# Patient Record
Sex: FEMALE | Race: WHITE | NOT HISPANIC OR LATINO
[De-identification: names, ages, dates, MRNs, and addresses within clinical notes are randomized per-mention and may not be internally consistent; named-entity substitution may affect disease eponyms.]

---

## 2021-02-06 ENCOUNTER — APPOINTMENT (OUTPATIENT)
Dept: AFTER HOURS CARE | Facility: EMERGENCY ROOM | Age: 66
End: 2021-02-06
Payer: MEDICARE

## 2021-02-06 DIAGNOSIS — R30.0 DYSURIA: ICD-10-CM

## 2021-02-06 PROBLEM — Z00.00 ENCOUNTER FOR PREVENTIVE HEALTH EXAMINATION: Status: ACTIVE | Noted: 2021-02-06

## 2021-02-06 PROCEDURE — 99204 OFFICE O/P NEW MOD 45 MIN: CPT | Mod: 95

## 2021-02-10 PROBLEM — R30.0 DYSURIA: Status: ACTIVE | Noted: 2021-02-10

## 2021-07-28 NOTE — HISTORY OF PRESENT ILLNESS
[Home] : at home, [unfilled] , at the time of the visit. [Verbal consent obtained from patient] : the patient, [unfilled] [Other Location: e.g. Home (Enter Location, City,State)___] : at [unfilled] [FreeTextEntry8] : 66F no hx p/w 3d dysuria and frequency without fever, back pain. Pt also st that she had diarrhea yesterday and the day before but she has been comfortable with no BMs today.  Pt st she thinks the diarrhea is unrelated since she feels better GI-wise, but her urinary sx continue. St it feels similar to her UTI in the past.  Not sexually active.  No vag discharge.  No n/v.

## 2023-04-07 ENCOUNTER — EMERGENCY (EMERGENCY)
Facility: HOSPITAL | Age: 68
LOS: 0 days | Discharge: HOME | End: 2023-04-07
Attending: STUDENT IN AN ORGANIZED HEALTH CARE EDUCATION/TRAINING PROGRAM
Payer: MEDICARE

## 2023-04-07 VITALS
HEART RATE: 81 BPM | DIASTOLIC BLOOD PRESSURE: 78 MMHG | RESPIRATION RATE: 20 BRPM | TEMPERATURE: 98 F | OXYGEN SATURATION: 98 % | SYSTOLIC BLOOD PRESSURE: 136 MMHG

## 2023-04-07 VITALS
HEART RATE: 78 BPM | SYSTOLIC BLOOD PRESSURE: 145 MMHG | TEMPERATURE: 98 F | RESPIRATION RATE: 18 BRPM | WEIGHT: 197.98 LBS | DIASTOLIC BLOOD PRESSURE: 83 MMHG | HEIGHT: 65 IN | OXYGEN SATURATION: 97 %

## 2023-04-07 DIAGNOSIS — R30.0 DYSURIA: ICD-10-CM

## 2023-04-07 DIAGNOSIS — N39.0 URINARY TRACT INFECTION, SITE NOT SPECIFIED: ICD-10-CM

## 2023-04-07 DIAGNOSIS — R35.0 FREQUENCY OF MICTURITION: ICD-10-CM

## 2023-04-07 DIAGNOSIS — N13.2 HYDRONEPHROSIS WITH RENAL AND URETERAL CALCULOUS OBSTRUCTION: ICD-10-CM

## 2023-04-07 LAB
ALBUMIN SERPL ELPH-MCNC: 4 G/DL — SIGNIFICANT CHANGE UP (ref 3.3–5)
ALP SERPL-CCNC: 65 U/L — SIGNIFICANT CHANGE UP (ref 40–120)
ALT FLD-CCNC: 26 U/L — SIGNIFICANT CHANGE UP (ref 12–78)
ANION GAP SERPL CALC-SCNC: 5 MMOL/L — SIGNIFICANT CHANGE UP (ref 5–17)
APPEARANCE UR: CLEAR — SIGNIFICANT CHANGE UP
AST SERPL-CCNC: 11 U/L — LOW (ref 15–37)
BACTERIA # UR AUTO: ABNORMAL
BASOPHILS # BLD AUTO: 0.06 K/UL — SIGNIFICANT CHANGE UP (ref 0–0.2)
BASOPHILS NFR BLD AUTO: 1 % — SIGNIFICANT CHANGE UP (ref 0–2)
BILIRUB SERPL-MCNC: 1.2 MG/DL — SIGNIFICANT CHANGE UP (ref 0.2–1.2)
BILIRUB UR-MCNC: NEGATIVE — SIGNIFICANT CHANGE UP
BUN SERPL-MCNC: 17 MG/DL — SIGNIFICANT CHANGE UP (ref 7–23)
CALCIUM SERPL-MCNC: 8.6 MG/DL — SIGNIFICANT CHANGE UP (ref 8.5–10.1)
CHLORIDE SERPL-SCNC: 108 MMOL/L — SIGNIFICANT CHANGE UP (ref 96–108)
CO2 SERPL-SCNC: 27 MMOL/L — SIGNIFICANT CHANGE UP (ref 22–31)
COLOR SPEC: YELLOW — SIGNIFICANT CHANGE UP
CREAT SERPL-MCNC: 0.61 MG/DL — SIGNIFICANT CHANGE UP (ref 0.5–1.3)
DIFF PNL FLD: ABNORMAL
EGFR: 97 ML/MIN/1.73M2 — SIGNIFICANT CHANGE UP
EOSINOPHIL # BLD AUTO: 0.13 K/UL — SIGNIFICANT CHANGE UP (ref 0–0.5)
EOSINOPHIL NFR BLD AUTO: 2.2 % — SIGNIFICANT CHANGE UP (ref 0–6)
EPI CELLS # UR: ABNORMAL
GLUCOSE SERPL-MCNC: 100 MG/DL — HIGH (ref 70–99)
GLUCOSE UR QL: NEGATIVE MG/DL — SIGNIFICANT CHANGE UP
HCT VFR BLD CALC: 46.4 % — HIGH (ref 34.5–45)
HGB BLD-MCNC: 14.8 G/DL — SIGNIFICANT CHANGE UP (ref 11.5–15.5)
IMM GRANULOCYTES NFR BLD AUTO: 0.2 % — SIGNIFICANT CHANGE UP (ref 0–0.9)
KETONES UR-MCNC: NEGATIVE — SIGNIFICANT CHANGE UP
LACTATE SERPL-SCNC: 0.8 MMOL/L — SIGNIFICANT CHANGE UP (ref 0.7–2)
LEUKOCYTE ESTERASE UR-ACNC: NEGATIVE — SIGNIFICANT CHANGE UP
LYMPHOCYTES # BLD AUTO: 2.17 K/UL — SIGNIFICANT CHANGE UP (ref 1–3.3)
LYMPHOCYTES # BLD AUTO: 37.5 % — SIGNIFICANT CHANGE UP (ref 13–44)
MCHC RBC-ENTMCNC: 28.2 PG — SIGNIFICANT CHANGE UP (ref 27–34)
MCHC RBC-ENTMCNC: 31.9 G/DL — LOW (ref 32–36)
MCV RBC AUTO: 88.4 FL — SIGNIFICANT CHANGE UP (ref 80–100)
MONOCYTES # BLD AUTO: 0.46 K/UL — SIGNIFICANT CHANGE UP (ref 0–0.9)
MONOCYTES NFR BLD AUTO: 8 % — SIGNIFICANT CHANGE UP (ref 2–14)
NEUTROPHILS # BLD AUTO: 2.95 K/UL — SIGNIFICANT CHANGE UP (ref 1.8–7.4)
NEUTROPHILS NFR BLD AUTO: 51.1 % — SIGNIFICANT CHANGE UP (ref 43–77)
NITRITE UR-MCNC: NEGATIVE — SIGNIFICANT CHANGE UP
NRBC # BLD: 0 /100 WBCS — SIGNIFICANT CHANGE UP (ref 0–0)
PH UR: 5 — SIGNIFICANT CHANGE UP (ref 5–8)
PLATELET # BLD AUTO: 252 K/UL — SIGNIFICANT CHANGE UP (ref 150–400)
POTASSIUM SERPL-MCNC: 3.6 MMOL/L — SIGNIFICANT CHANGE UP (ref 3.5–5.3)
POTASSIUM SERPL-SCNC: 3.6 MMOL/L — SIGNIFICANT CHANGE UP (ref 3.5–5.3)
PROT SERPL-MCNC: 7.9 GM/DL — SIGNIFICANT CHANGE UP (ref 6–8.3)
PROT UR-MCNC: 15 MG/DL
RBC # BLD: 5.25 M/UL — HIGH (ref 3.8–5.2)
RBC # FLD: 13.7 % — SIGNIFICANT CHANGE UP (ref 10.3–14.5)
RBC CASTS # UR COMP ASSIST: >50 /HPF (ref 0–4)
SODIUM SERPL-SCNC: 140 MMOL/L — SIGNIFICANT CHANGE UP (ref 135–145)
SP GR SPEC: 1.02 — SIGNIFICANT CHANGE UP (ref 1.01–1.02)
UROBILINOGEN FLD QL: NEGATIVE MG/DL — SIGNIFICANT CHANGE UP
WBC # BLD: 5.78 K/UL — SIGNIFICANT CHANGE UP (ref 3.8–10.5)
WBC # FLD AUTO: 5.78 K/UL — SIGNIFICANT CHANGE UP (ref 3.8–10.5)
WBC UR QL: ABNORMAL

## 2023-04-07 PROCEDURE — 99284 EMERGENCY DEPT VISIT MOD MDM: CPT

## 2023-04-07 PROCEDURE — 76775 US EXAM ABDO BACK WALL LIM: CPT | Mod: 26

## 2023-04-07 PROCEDURE — 99285 EMERGENCY DEPT VISIT HI MDM: CPT

## 2023-04-07 RX ORDER — PHENAZOPYRIDINE HCL 100 MG
200 TABLET ORAL ONCE
Refills: 0 | Status: COMPLETED | OUTPATIENT
Start: 2023-04-07 | End: 2023-04-07

## 2023-04-07 RX ORDER — TAMSULOSIN HYDROCHLORIDE 0.4 MG/1
1 CAPSULE ORAL
Qty: 5 | Refills: 0
Start: 2023-04-07 | End: 2023-04-11

## 2023-04-07 RX ORDER — IBUPROFEN 200 MG
1 TABLET ORAL
Qty: 12 | Refills: 0
Start: 2023-04-07 | End: 2023-04-09

## 2023-04-07 RX ORDER — CEFDINIR 250 MG/5ML
1 POWDER, FOR SUSPENSION ORAL
Qty: 20 | Refills: 0
Start: 2023-04-07 | End: 2023-04-16

## 2023-04-07 RX ORDER — SODIUM CHLORIDE 9 MG/ML
1000 INJECTION INTRAMUSCULAR; INTRAVENOUS; SUBCUTANEOUS ONCE
Refills: 0 | Status: COMPLETED | OUTPATIENT
Start: 2023-04-07 | End: 2023-04-07

## 2023-04-07 RX ORDER — CEFTRIAXONE 500 MG/1
1000 INJECTION, POWDER, FOR SOLUTION INTRAMUSCULAR; INTRAVENOUS ONCE
Refills: 0 | Status: COMPLETED | OUTPATIENT
Start: 2023-04-07 | End: 2023-04-07

## 2023-04-07 RX ADMIN — CEFTRIAXONE 100 MILLIGRAM(S): 500 INJECTION, POWDER, FOR SOLUTION INTRAMUSCULAR; INTRAVENOUS at 17:01

## 2023-04-07 RX ADMIN — SODIUM CHLORIDE 1000 MILLILITER(S): 9 INJECTION INTRAMUSCULAR; INTRAVENOUS; SUBCUTANEOUS at 16:35

## 2023-04-07 RX ADMIN — SODIUM CHLORIDE 1000 MILLILITER(S): 9 INJECTION INTRAMUSCULAR; INTRAVENOUS; SUBCUTANEOUS at 15:32

## 2023-04-07 RX ADMIN — Medication 200 MILLIGRAM(S): at 17:01

## 2023-04-07 NOTE — ED ADULT NURSE NOTE - URINE CHARACTERISTICS
Brandin Santos is overdue for her INR:     Date of next INR: 4/22/2022    [facility] clear

## 2023-04-07 NOTE — ED PROVIDER NOTE - PHYSICAL EXAMINATION
GENERAL: Awake, alert, NAD  HEENT: NC/AT, moist mucous membranes  LUNGS: CTAB, no wheezes or crackles   CARDIAC: RRR, no m/r/g  ABDOMEN: Soft, normal BS, non tender, non distended, no rebound, no guarding  BACK: No midline spinal tenderness, no CVA tenderness  EXT: No edema, no calf tenderness, no deformities.  NEURO: A&Ox3. Moving all extremities.  SKIN: Warm and dry. No rash.  PSYCH: Normal affect.

## 2023-04-07 NOTE — ED PROVIDER NOTE - CARE PROVIDER_API CALL
Kervin Mack)  Urology  [address] [facility], [address] [place] [address]  Phone: [phone]  Fax: [phone]  Follow Up Time: 1-3 Days

## 2023-04-07 NOTE — ED PROVIDER NOTE - NS ED ROS FT
CONST: no fevers, no chills  EYES: no pain, no vision changes  ENT: no sore throat, no ear pain, no change in hearing  CV: no chest pain, no leg swelling  RESP: no shortness of breath, no cough  ABD: no abdominal pain, no nausea, no vomiting, no diarrhea  : + dysuria, +frequency, no flank pain, no hematuria  MSK: no back pain, no extremity pain  NEURO: no headache or additional neurologic complaints  HEME: no easy bleeding  SKIN:  no rash

## 2023-04-07 NOTE — ED PROVIDER NOTE - NSFOLLOWUPINSTRUCTIONS_ED_ALL_ED_FT
You were seen in the ED for a kidney stone.    Please follow up with urology in the office.    Take antibiotics and flomax as prescribed.    You can use ibuprofen every 6 hours as needed for pain.    Strain your urine.    If you develop fever, chills, worsening pain, vomiting, difficulty urinating, please return to the ED.

## 2023-04-07 NOTE — ED PROVIDER NOTE - OBJECTIVE STATEMENT
69 y/o F with no significant PMH presenting to the ED with dysuria, frequency. States her symptoms have been ongoing x 1 week. She finished a course of bactrim x 3 days and started taking cipro as symptoms did not resolve. Now is day 3 of cipro. She reports having an outpatient CT that demonstrated a L sided kidney stone. Reports she previously had back pain but that has now resolved. No fever, chills, N/V. No hx of kidney stones or UTIs. No hematuria.

## 2023-04-07 NOTE — ED ADULT NURSE NOTE - NS ED NOTE  TALK SOMEONE YN
's visit attempted in Pre-op. Mr. Kenna Saravia, preferred name Nicole Liang, Kumar's wife [address] [place] [address] in the waiting room and offered prayer for patient, family, and staff.      Paxton Lyon 68  Board Certified  No

## 2023-04-07 NOTE — ED PROVIDER NOTE - PATIENT PORTAL LINK FT
You can access the FollowMyHealth Patient Portal offered by [facility] by registering at the following website: [email or web address]. By joining [employer]’s FollowMyHealth portal, you will also be able to view your health information using other applications (apps) compatible with our system.
no

## 2023-04-07 NOTE — ED ADULT TRIAGE NOTE - CHIEF COMPLAINT QUOTE
pt vaginal and left flank pain x 1 week. telehealth gave rx bactrim x 1 week ago, given rx cipro and ct scan done by urgent care yesterday. sent to ED by urgent care for + kidney stones.

## 2023-04-07 NOTE — CONSULT NOTE ADULT - SUBJECTIVE AND OBJECTIVE BOX
Chief Complaint:  Patient is a 68y old  Female who presents with a chief complaint of     HPI: 67 y/o F with no significant PMH presenting to the ED with dysuria, frequency. States her symptoms have been ongoing x 1 week. She finished a course of bactrim x 3 days and started taking cipro as symptoms did not resolve. Now is day 3 of cipro. She reports having an outpatient CT that demonstrated a L sided kidney stone. Reports she previously had back pain but that has now resolved. No fever, chills, N/V. No hx of kidney stones or UTIs. No hematuria.      PMH/PSH:PAST MEDICAL & SURGICAL HISTORY:      Allergies:  No Known Allergies      Medications:      Review of Systems:    Negative exept for HPI    Relevant Family History:   FAMILY HISTORY:      Relevant Social History: Alcohol ( -) , Tobacco ( -) , Illicit drugs (- )     Physical Exam:    Vital Signs:  Vital Signs Last 24 Hrs  T(C): 36.9 (2023 13:57), Max: 36.9 (2023 13:57)  T(F): 98.4 (2023 13:57), Max: 98.4 (2023 13:57)  HR: 78 (2023 13:57) (78 - 78)  BP: 145/83 (2023 13:57) (145/83 - 145/83)  BP(mean): --  RR: 18 (2023 13:57) (18 - 18)  SpO2: 97% (2023 13:57) (97% - 97%)    Parameters below as of 2023 13:57  Patient On (Oxygen Delivery Method): room air      Daily Height in cm: 165.1 (2023 13:57)    Daily     General:  Appears stated age, well-groomed, well-nourished, no distress  HEENT:  NC/AT,  conjunctivae clear and pink, no thyromegaly, nodules, adenopathy, no JVD, anicteric sclera  Chest:  Full & symmetric excursion, no increased effort, breath sounds clear  Cardiovascular:  Regular rhythm, S1, S2, no murmur/rub/S3/S4, no abdominal bruit, no edema  Abdomen:  Soft, non tender, non distended, normoactive bowel sounds,  no masses , no hepatosplenomegaly, no signs of chronic liver disease  Extremities:  no cyanosis, clubbing or edema  Skin:  No rash/erythema/ecchymoses/petechiae/wounds/abscess/warm/dry  Neuro/Psych:  Alert, oriented, no asterixis, no tremor, no encephalopathy  : No CVA or SP tenderness, adequate meatus, bladder nonpalpable    Laboratory:                          14.8   5.78  )-----------( 252      ( 2023 15:25 )             46.4         140  |  108  |  17  ----------------------------<  100<H>  3.6   |  27  |  0.61    Ca    8.6      2023 15:25    TPro  7.9  /  Alb  4.0  /  TBili  1.2  /  DBili  x   /  AST  11<L>  /  ALT  26  /  AlkPhos  65      LIVER FUNCTIONS - ( 2023 15:25 )  Alb: 4.0 g/dL / Pro: 7.9 gm/dL / ALK PHOS: 65 U/L / ALT: 26 U/L / AST: 11 U/L / GGT: x             Urinalysis Basic - ( 2023 15:25 )    Color: Yellow / Appearance: Clear / S.025 / pH: x  Gluc: x / Ketone: Negative  / Bili: Negative / Urobili: Negative mg/dL   Blood: x / Protein: 15 mg/dL / Nitrite: Negative   Leuk Esterase: Negative / RBC: >50 /HPF / WBC 6-10   Sq Epi: x / Non Sq Epi: x / Bacteria: Few    Intake and Output      Imaging:  < from: US Renal (23 @ 16:56) >    ACC: [id number] EXAM:  US KIDNEY(S)   ORDERED BY: LITA PIERCE     PROCEDURE DATE:  2023          INTERPRETATION:  CLINICAL INFORMATION: Microscopic hematuria.    COMPARISON: None available.    TECHNIQUE: Sonography of the kidneys and bladder.    FINDINGS:  Right kidney: 12.4 cm. Mild right-sided hydronephrosis. Multiple   hyperechoic foci are noted within the renal hilar regions measuring up to   1.4 cm, likely renal calculi. No space-occupying lesions evident.    Left kidney: 12.1 cm. Mild left-sided hydronephrosis. No renal calculi.   No space-occupying lesions.    Urinary bladder: Bilateral ureteral jets noted. Prevoid urinary bladder   volume approximates 62 cc. Post void urinary bladder volume approximates   18 cc. A 0.8 x 0.7 x 0.6 cm hyperechoic focus is identified likely in the   region of the left UVJ.    IMPRESSION:  Mild bilateral hydronephrosis. Right renal calculi. A 0.8 x 0.7 x 0.6 cm   hyperechoic focus is identified likely in the region of the left UVJ.   Bilateral ureteral jets noted.

## 2023-04-07 NOTE — CONSULT NOTE ADULT - ASSESSMENT
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   Will need CT urogram, Can follow up with Dr Mack in office on Monday as discussed with patient

## 2023-04-07 NOTE — ED PROVIDER NOTE - CLINICAL SUMMARY MEDICAL DECISION MAKING FREE TEXT BOX
69 y/o F with no significant PMH presenting to the ED with dysuria in setting of renal stone.  Vitals stable.  Will obtain labs in setting of UTI, will obtain US to evaluate for hydro in setting of renal stone  Pain currently controlled  Reassess following labs/imaging

## 2023-04-07 NOTE — ED PROVIDER NOTE - PROGRESS NOTE DETAILS
U/S with L sided renal stone at UVJ, +mild bilateral hydro, urine appears infected. Discussed with urology, will come see patient.

## 2023-04-08 LAB
CULTURE RESULTS: SIGNIFICANT CHANGE UP
SPECIMEN SOURCE: SIGNIFICANT CHANGE UP

## 2023-04-10 PROBLEM — Z00.00 ENCOUNTER FOR PREVENTIVE HEALTH EXAMINATION: Noted: 2023-04-10

## 2023-04-14 ENCOUNTER — APPOINTMENT (OUTPATIENT)
Dept: UROLOGY | Facility: CLINIC | Age: 68
End: 2023-04-14
Payer: MEDICARE

## 2023-04-14 VITALS
DIASTOLIC BLOOD PRESSURE: 80 MMHG | HEIGHT: 65 IN | WEIGHT: 198 LBS | TEMPERATURE: 97.8 F | BODY MASS INDEX: 32.99 KG/M2 | SYSTOLIC BLOOD PRESSURE: 142 MMHG | OXYGEN SATURATION: 96 % | HEART RATE: 76 BPM

## 2023-04-14 DIAGNOSIS — Z82.49 FAMILY HISTORY OF ISCHEMIC HEART DISEASE AND OTHER DISEASES OF THE CIRCULATORY SYSTEM: ICD-10-CM

## 2023-04-14 DIAGNOSIS — Z78.9 OTHER SPECIFIED HEALTH STATUS: ICD-10-CM

## 2023-04-14 DIAGNOSIS — Z83.3 FAMILY HISTORY OF DIABETES MELLITUS: ICD-10-CM

## 2023-04-14 PROCEDURE — 99204 OFFICE O/P NEW MOD 45 MIN: CPT

## 2023-04-14 RX ORDER — TAMSULOSIN HYDROCHLORIDE 0.4 MG/1
CAPSULE ORAL
Refills: 0 | Status: DISCONTINUED | COMMUNITY

## 2023-04-14 RX ORDER — CEFDINIR 300 MG/1
CAPSULE ORAL
Refills: 0 | Status: DISCONTINUED | COMMUNITY

## 2023-04-14 RX ORDER — IBUPROFEN 600 MG/1
600 TABLET, FILM COATED ORAL
Refills: 0 | Status: DISCONTINUED | COMMUNITY

## 2023-04-14 NOTE — HISTORY OF PRESENT ILLNESS
[FreeTextEntry1] : 68 year old female here for hospital follow up,\par \par  was in ER at [place], for L side flank pain\par \par L flank pain pain onset 3/31/2023, w urinary frequency, dysuria\par denies nausea, fever, vomiting, and history of stones\par teledoc visit- given 3 days of bactrim\par \par 1 week later, no resolution of symptoms, went to urgent care- given cipro, which she is still taking, and sent for CT scan\par \par CT scan shows-\par abnormal pancreatic finding\par 2.8 cm left adrenal nodule\par  R kidney stones largest of which is 0.3 mm and duplicated collecting system\par \par L kidney fullness and probable cyst\par 8 mm UVJ and 5 mm distal L ureteral stones\par \par In ER had renal sono-left hydro and right hydro lower pole moiety\par \par \par \par pt brought ureteral stone which she passed 4/9/2023\par was on tamsulosin, has discontinued since passing stone\par \par reviewed lab work from ER\par CBC normal\par CMP normal calcium, creatine (all normal)\par UA+micro- microhematuria\par urine culture- negative

## 2023-04-14 NOTE — REVIEW OF SYSTEMS
[Feeling Tired] : feeling tired [Abdominal Pain] : abdominal pain [Constipation] : constipation [Urine Infection (bladder/kidney)] : bladder/kidney infection [Pain during urination] : pain during urination [Blood in urine that you can see] : blood visible in urine [History of kidney stones] : history of kidney stones [Wake up at night to urinate  How many times?  ___] : wakes up to urinate [unfilled] times during the night [Strain or push to urinate] : strain or push to urinate [Bladder fullness after urinating] : bladder fullness after urinating [Leakage of urine with straining, coughing, laughing] : leakage of urine with straining, coughing, laughing [see HPI] : see HPI [Negative] : Gastrointestinal

## 2023-04-14 NOTE — END OF VISIT
[FreeTextEntry3] : All medical record entries made by the scribe today, were at my direction and personally dictated to them by me, Dr. Kervin Mack on 04/14/2023. I have reviewed the chart and agree that the record accurately reflects my personal performance of the history, physical exam, assessment, and plan. I have also personally directed, reviewed, and agreed with the chart.\par

## 2023-04-14 NOTE — PHYSICAL EXAM
[General Appearance - Well Developed] : well developed [General Appearance - Well Nourished] : well nourished [Normal Appearance] : normal appearance [Well Groomed] : well groomed [General Appearance - In No Acute Distress] : no acute distress [Edema] : no peripheral edema [Respiration, Rhythm And Depth] : normal respiratory rhythm and effort [Exaggerated Use Of Accessory Muscles For Inspiration] : no accessory muscle use [Abdomen Soft] : soft [Abdomen Tenderness] : non-tender [Costovertebral Angle Tenderness] : no ~M costovertebral angle tenderness [Normal Station and Gait] : the gait and station were normal for the patient's age [] : no rash [No Focal Deficits] : no focal deficits [Oriented To Time, Place, And Person] : oriented to person, place, and time [Affect] : the affect was normal [Mood] : the mood was normal [Not Anxious] : not anxious [No Palpable Adenopathy] : no palpable adenopathy [FreeTextEntry1] : chaperone- lilia small

## 2023-04-14 NOTE — ASSESSMENT
[FreeTextEntry1] : 68 year old female w hydronephrosis, kidney stones, and ureteral stones, \par \par passed the 8 mm stone\par \par Advise 24 hr urine calcium, citrate, uric acid, and oxalate, \par \par  serum uric acid sent today\par \par ordered CT abdomen and pelvis urogram w/wo IV contrast to follow up hydro and remaining stone\par \par  analysis of passed stone ordered\par \par can discontinue cefidinir\par \par advised pt to increase water intake to 2 1/2 liters\par \par will send request for [facility] endocrinologist to eval adrenal adenoma\par \par  Also  GI to f/u  abnormal pancreatic finding\par \par await CTU and 24 hour urine\par \par f/u for renal US in 6 months based on findings\par \par

## 2023-04-14 NOTE — LETTER BODY
[Dear  ___] : Dear  [unfilled], [Consult Letter:] : I had the pleasure of evaluating your patient, [unfilled]. [Please see my note below.] : Please see my note below. [Consult Closing:] : Thank you very much for allowing me to participate in the care of this patient.  If you have any questions, please do not hesitate to contact me. [Sincerely,] : Sincerely, [FreeTextEntry1] : see below note [FreeTextEntry3] : Kervin Mack MD FACS\par \par Attending Urologist-[facility]\par Chief-Urology Division [facility]\par Associate Clinical Professor-[employer] [facility] [facility]\par \par

## 2023-04-18 ENCOUNTER — APPOINTMENT (OUTPATIENT)
Dept: UROLOGY | Facility: CLINIC | Age: 68
End: 2023-04-18

## 2023-04-19 ENCOUNTER — APPOINTMENT (OUTPATIENT)
Dept: CT IMAGING | Facility: CLINIC | Age: 68
End: 2023-04-19
Payer: MEDICARE

## 2023-04-19 ENCOUNTER — OUTPATIENT (OUTPATIENT)
Dept: OUTPATIENT SERVICES | Facility: HOSPITAL | Age: 68
LOS: 1 days | End: 2023-04-19
Payer: MEDICARE

## 2023-04-19 ENCOUNTER — TRANSCRIPTION ENCOUNTER (OUTPATIENT)
Age: 68
End: 2023-04-19

## 2023-04-19 DIAGNOSIS — N20.9 URINARY CALCULUS, UNSPECIFIED: ICD-10-CM

## 2023-04-19 PROCEDURE — 74178 CT ABD&PLV WO CNTR FLWD CNTR: CPT

## 2023-04-19 PROCEDURE — 74178 CT ABD&PLV WO CNTR FLWD CNTR: CPT | Mod: 26,MH

## 2023-04-25 ENCOUNTER — NON-APPOINTMENT (OUTPATIENT)
Age: 68
End: 2023-04-25

## 2023-04-25 LAB
NIDUS STONE QN: NORMAL
RESULT COMMENT: NORMAL
URATE SERPL-MCNC: 5.2 MG/DL

## 2023-05-05 ENCOUNTER — APPOINTMENT (OUTPATIENT)
Dept: UROLOGY | Facility: CLINIC | Age: 68
End: 2023-05-05
Payer: MEDICARE

## 2023-05-05 VITALS
HEART RATE: 74 BPM | TEMPERATURE: 97.4 F | SYSTOLIC BLOOD PRESSURE: 136 MMHG | DIASTOLIC BLOOD PRESSURE: 83 MMHG | OXYGEN SATURATION: 97 %

## 2023-05-05 PROCEDURE — 76775 US EXAM ABDO BACK WALL LIM: CPT

## 2023-05-05 PROCEDURE — 99213 OFFICE O/P EST LOW 20 MIN: CPT

## 2023-05-05 NOTE — PHYSICAL EXAM
[General Appearance - Well Developed] : well developed [General Appearance - Well Nourished] : well nourished [Normal Appearance] : normal appearance [Well Groomed] : well groomed [General Appearance - In No Acute Distress] : no acute distress [Abdomen Soft] : soft [Costovertebral Angle Tenderness] : no ~M costovertebral angle tenderness [Abdomen Tenderness] : non-tender

## 2023-05-27 LAB
CALCIUM SERPL-MCNC: 9 MG/DL
PARATHYROID HORMONE INTACT: 46 PG/ML

## 2023-05-27 NOTE — HISTORY OF PRESENT ILLNESS
[FreeTextEntry1] : 67 y/o female following up for hydronephrosis,with  ureteral stones\par \par kidney stone analysis 4/14/2023-\par 60% calcium oxalate monohydrate\par 30% calcium phosphate (apatite)\par 10% calcium oxalate dihydrate\par \par reviewed 24 hr urine- low urine volume and hypercalcuria\par \par currently without pain-?passed second stone\par \par

## 2023-05-27 NOTE — END OF VISIT
[FreeTextEntry3] : All medical record entries made by the scribe today, were at my direction and personally dictated to them by me, Dr. Kervin Mack on 05/05/2023. I have reviewed the chart and agree that the record accurately reflects my personal performance of the history, physical exam, assessment, and plan. I have also personally directed, reviewed, and agreed with the chart.\par

## 2023-05-27 NOTE — ASSESSMENT
[FreeTextEntry1] : 67 y/o female w kidney stones here for renal US\par \par PTH sent today\par \par renal US today shows-\par parapelvic cysts on R side as well as duplicatied collecting system\par L kidney demonstrates mild hydronephrosis\par both kidneys normal in size and echogenicity w/o stones or solid masses\par \par continue to strain urine for stone collection,\par if unable to pass, d/w pt and her daughter possible surgery- u-scope w laser litho / stent\par all risks, benefits reviewed\par \par \par continue to maintain high water intake up to 2.5 L per day\par \par pt will call office if she develops fever, nausea, intense pain\par \par has gone to GI for abnormal pancreatic finding, is to go for MRI w sedation\par \par has appt scheduled w endocrinologist to evaluate adrenal adenoma and hypercalcuria\par \par f/u in 6 months for renal US unless OR is needed

## 2023-06-28 ENCOUNTER — APPOINTMENT (OUTPATIENT)
Dept: ENDOCRINOLOGY | Facility: CLINIC | Age: 68
End: 2023-06-28
Payer: MEDICARE

## 2023-06-28 VITALS
SYSTOLIC BLOOD PRESSURE: 122 MMHG | TEMPERATURE: 97.2 F | HEIGHT: 65 IN | DIASTOLIC BLOOD PRESSURE: 76 MMHG | HEART RATE: 96 BPM | BODY MASS INDEX: 33.55 KG/M2 | OXYGEN SATURATION: 98 % | WEIGHT: 201.38 LBS

## 2023-06-28 PROCEDURE — 99204 OFFICE O/P NEW MOD 45 MIN: CPT

## 2023-06-28 NOTE — HISTORY OF PRESENT ILLNESS
[FreeTextEntry1] : 68 year F here for assessment of adrenal adenoma: \par \par Noted as incidental finding on CT urogram done for evaluation for nephrolithiasis \par \par Date of Imagin2023\par Modality: CT A/P left 2.8cm adrenal nodule c/w adenoma\par \par Patient with past medical hx as below, remarkable for:  renal stones \par \par History of known prior/ current malignancy: No/\par    \par Episodic headaches: No \par Episodic sweating: No\par Forceful palpitations: No\par Tremors: No\par Pallor: No\par Dyspnea: No \par Generalized weakness: No\par Panic attacks: No\par Weight loss: No\par Polyuria: No\par Polydipsia: No \par Constipation: No \par Known history of elevations in blood pressure related to diagnostic procedures, anesthesia induction, surgery, with certain foods or beverages: No\par \par History of HTN: No\par Renal impairment: No \par \par Appreciable fat buildup in face, neck, back, abdomen, chest: No\par Arms and legs becoming thin: No\par Purple stretch marks: No\par \par Women:  \par Hirsutism: No\par Acne: No\par Male-pattern baldness: No\par Menstrual irregularities, oligomenorrhea or amenorrhea: No \par Infertility: No\par \par \par

## 2023-06-28 NOTE — REASON FOR VISIT
[Initial Evaluation] : an initial evaluation [Adrenal Evaluation/Adrenal Disorder] : adrenal evaluation/adrenal disorder [Family Member] : family member

## 2023-07-05 LAB
ALDOSTERONE SERUM: 22.9 NG/DL
ANION GAP SERPL CALC-SCNC: 14 MMOL/L
BUN SERPL-MCNC: 18 MG/DL
CALCIUM SERPL-MCNC: 9.6 MG/DL
CHLORIDE SERPL-SCNC: 110 MMOL/L
CO2 SERPL-SCNC: 21 MMOL/L
CORTIS SERPL-MCNC: 2.1 UG/DL
CREAT SERPL-MCNC: 0.55 MG/DL
EGFR: 100 ML/MIN/1.73M2
GLUCOSE SERPL-MCNC: 129 MG/DL
POTASSIUM SERPL-SCNC: 4.3 MMOL/L
SODIUM SERPL-SCNC: 144 MMOL/L

## 2023-07-06 LAB
METANEPHRINE, PL: 13.9 PG/ML
NORMETANEPHRINE, PL: 92.5 PG/ML

## 2023-07-10 LAB — DHEA-SULFATE, SERUM: 40 UG/DL

## 2023-07-12 LAB — DEXAMETHASONE LEVEL: 235 NG/DL

## 2023-07-17 LAB — RENIN ACTIVITY, PLASMA: 0.85 NG/ML/HR

## 2023-07-31 ENCOUNTER — APPOINTMENT (OUTPATIENT)
Dept: ENDOCRINOLOGY | Facility: CLINIC | Age: 68
End: 2023-07-31
Payer: MEDICARE

## 2023-07-31 VITALS
OXYGEN SATURATION: 96 % | WEIGHT: 199.31 LBS | HEART RATE: 86 BPM | SYSTOLIC BLOOD PRESSURE: 138 MMHG | DIASTOLIC BLOOD PRESSURE: 82 MMHG | HEIGHT: 65 IN | BODY MASS INDEX: 33.21 KG/M2 | TEMPERATURE: 97.6 F

## 2023-07-31 PROCEDURE — 99214 OFFICE O/P EST MOD 30 MIN: CPT

## 2023-08-16 LAB — CORTIS SERPL-MCNC: 2.9 UG/DL

## 2023-08-16 NOTE — HISTORY OF PRESENT ILLNESS
[FreeTextEntry1] : 68 year F here for f/up  of adrenal adenoma:   Noted as incidental finding on CT urogram done for evaluation for nephrolithiasis   Date of Imagin2023 Modality: CT A/P left 2.8cm adrenal nodule c/w adenoma  Patient with past medical hx as below, remarkable for:  renal stones   No new complaints. At last visit lab work up ordered.   She reports that she has another MRI planned with urology in 2023

## 2023-08-16 NOTE — ADDENDUM
[FreeTextEntry1] : 08/16/2023  01:12 PM   Contacted Ms. AYAZ MARRERO at telephone number listed on file to review results of  am cortisol/ 1mg DST done on 8/15/23. Patient agrees to review over phone. Did not fully suppress  -will do 24hr urine cortisol : request to be sent to patient.

## 2023-08-29 ENCOUNTER — APPOINTMENT (OUTPATIENT)
Dept: UROLOGY | Facility: CLINIC | Age: 68
End: 2023-08-29
Payer: MEDICARE

## 2023-08-29 VITALS
WEIGHT: 191 LBS | BODY MASS INDEX: 31.82 KG/M2 | SYSTOLIC BLOOD PRESSURE: 133 MMHG | DIASTOLIC BLOOD PRESSURE: 76 MMHG | TEMPERATURE: 97.2 F | HEIGHT: 65 IN | OXYGEN SATURATION: 96 % | HEART RATE: 74 BPM

## 2023-08-29 DIAGNOSIS — N20.9 URINARY CALCULUS, UNSPECIFIED: ICD-10-CM

## 2023-08-29 PROCEDURE — 99212 OFFICE O/P EST SF 10 MIN: CPT

## 2023-08-29 NOTE — HISTORY OF PRESENT ILLNESS
[FreeTextEntry1] : 67 y/o female here today f/u for UTI h/x of kidney stones Pt went to urgent care over the weekend for frequency and burning, was started on Macrobid and Pyridium.

## 2023-08-29 NOTE — ASSESSMENT
[FreeTextEntry1] : 67 y/o female here today f/u for UTI h/x of kidney stones Pt went to urgent care over the weekend for frequency and burning, was started on Macrobid and Pyridium.  On last CT scan: IMPRESSION: Duplicated right collecting system with normal insertion on the urinary bladder. No hydronephrosis on the right. Mild left hydronephrosis in a UPJ pattern. 4 mm stone in the distal left ureter without ureteral dilatation. Indeterminate left adrenal nodule which can be better evaluated with an adrenal protocol CT.  The patient states having passed stones recently. She is scheduled for MRI in Oct and will come back for re-evaluation with results, to check for residual stones. Prescribing Tamsulosin 0.4 for 1 month.

## 2023-09-06 LAB
ALBUMIN SERPL ELPH-MCNC: 4.4 G/DL
ANION GAP SERPL CALC-SCNC: 12 MMOL/L
BUN SERPL-MCNC: 12 MG/DL
CALCIUM SERPL-MCNC: 9.5 MG/DL
CHLORIDE SERPL-SCNC: 105 MMOL/L
CO2 SERPL-SCNC: 24 MMOL/L
CREAT SERPL-MCNC: 0.67 MG/DL
EGFR: 95 ML/MIN/1.73M2
GLUCOSE SERPL-MCNC: 104 MG/DL
PHOSPHATE SERPL-MCNC: 2.8 MG/DL
POTASSIUM SERPL-SCNC: 4.2 MMOL/L
SODIUM SERPL-SCNC: 141 MMOL/L

## 2023-09-19 LAB
CORTIS 24H UR-MCNC: 24 H
CORTIS 24H UR-MRATE: 74 MCG/24 H
SPECIMEN VOL 24H UR: 1500 ML

## 2023-10-09 ENCOUNTER — APPOINTMENT (OUTPATIENT)
Dept: UROLOGY | Facility: CLINIC | Age: 68
End: 2023-10-09

## 2023-10-16 ENCOUNTER — APPOINTMENT (OUTPATIENT)
Dept: SURGERY | Facility: CLINIC | Age: 68
End: 2023-10-16
Payer: MEDICARE

## 2023-10-16 ENCOUNTER — NON-APPOINTMENT (OUTPATIENT)
Age: 68
End: 2023-10-16

## 2023-10-16 VITALS
BODY MASS INDEX: 31.32 KG/M2 | WEIGHT: 188 LBS | HEIGHT: 65 IN | HEART RATE: 73 BPM | SYSTOLIC BLOOD PRESSURE: 132 MMHG | DIASTOLIC BLOOD PRESSURE: 84 MMHG

## 2023-10-16 DIAGNOSIS — D35.00 BENIGN NEOPLASM OF UNSPECIFIED ADRENAL GLAND: ICD-10-CM

## 2023-10-16 PROCEDURE — 99204 OFFICE O/P NEW MOD 45 MIN: CPT

## 2023-10-17 ENCOUNTER — NON-APPOINTMENT (OUTPATIENT)
Age: 68
End: 2023-10-17

## 2023-10-17 LAB
ACTH SER-ACNC: 18.3 PG/ML
ALDOSTERONE SERUM: 21.3 NG/DL
CANCER AG19-9 SERPL-ACNC: 11 U/ML
CEA SERPL-MCNC: 1.5 NG/ML

## 2023-10-23 ENCOUNTER — APPOINTMENT (OUTPATIENT)
Dept: UROLOGY | Facility: CLINIC | Age: 68
End: 2023-10-23
Payer: MEDICARE

## 2023-10-23 VITALS
HEART RATE: 82 BPM | DIASTOLIC BLOOD PRESSURE: 88 MMHG | TEMPERATURE: 97.7 F | BODY MASS INDEX: 31.45 KG/M2 | SYSTOLIC BLOOD PRESSURE: 138 MMHG | WEIGHT: 189 LBS | OXYGEN SATURATION: 96 %

## 2023-10-23 PROCEDURE — 99213 OFFICE O/P EST LOW 20 MIN: CPT

## 2023-10-25 LAB — RENIN ACTIVITY, PLASMA: 0.32 NG/ML/HR

## 2023-10-27 LAB
ALBUMIN SERPL ELPH-MCNC: 4.4 G/DL
ANION GAP SERPL CALC-SCNC: 14 MMOL/L
APPEARANCE: ABNORMAL
BACTERIA UR CULT: NORMAL
BACTERIA: NEGATIVE /HPF
BILIRUBIN URINE: NEGATIVE
BLOOD URINE: ABNORMAL
BUN SERPL-MCNC: 17 MG/DL
CALCIUM OXALATE CRYSTALS: PRESENT
CALCIUM SERPL-MCNC: 9.2 MG/DL
CAST: 0 /LPF
CHLORIDE SERPL-SCNC: 110 MMOL/L
CO2 SERPL-SCNC: 20 MMOL/L
COLOR: YELLOW
CREAT SERPL-MCNC: 0.52 MG/DL
EGFR: 101 ML/MIN/1.73M2
EPITHELIAL CELLS: 3 /HPF
GLUCOSE QUALITATIVE U: NEGATIVE MG/DL
GLUCOSE SERPL-MCNC: 106 MG/DL
KETONES URINE: NEGATIVE MG/DL
KIDNEY STONE SOURCE: NORMAL
LEUKOCYTE ESTERASE URINE: ABNORMAL
MICROSCOPIC-UA: NORMAL
NIDUS STONE QN: NORMAL
NITRITE URINE: NEGATIVE
PH URINE: 5.5
PHOSPHATE SERPL-MCNC: 3 MG/DL
POTASSIUM SERPL-SCNC: 4 MMOL/L
PROTEIN URINE: NEGATIVE MG/DL
RED BLOOD CELLS URINE: 1 /HPF
RESULT COMMENT: NORMAL
REVIEW: NORMAL
SODIUM SERPL-SCNC: 144 MMOL/L
SPECIFIC GRAVITY URINE: 1.02
UROBILINOGEN URINE: 0.2 MG/DL
WHITE BLOOD CELLS URINE: 5 /HPF

## 2023-10-30 ENCOUNTER — APPOINTMENT (OUTPATIENT)
Dept: SURGERY | Facility: CLINIC | Age: 68
End: 2023-10-30
Payer: MEDICARE

## 2023-10-30 PROCEDURE — 99214 OFFICE O/P EST MOD 30 MIN: CPT

## 2023-11-07 ENCOUNTER — APPOINTMENT (OUTPATIENT)
Dept: SURGICAL ONCOLOGY | Facility: CLINIC | Age: 68
End: 2023-11-07
Payer: MEDICARE

## 2023-11-07 VITALS
BODY MASS INDEX: 31.99 KG/M2 | OXYGEN SATURATION: 96 % | SYSTOLIC BLOOD PRESSURE: 144 MMHG | WEIGHT: 192 LBS | HEIGHT: 65 IN | HEART RATE: 73 BPM | DIASTOLIC BLOOD PRESSURE: 84 MMHG

## 2023-11-07 DIAGNOSIS — F41.9 ANXIETY DISORDER, UNSPECIFIED: ICD-10-CM

## 2023-11-07 PROCEDURE — 99213 OFFICE O/P EST LOW 20 MIN: CPT

## 2023-11-07 RX ORDER — TAMSULOSIN HYDROCHLORIDE 0.4 MG/1
0.4 CAPSULE ORAL
Qty: 30 | Refills: 0 | Status: COMPLETED | COMMUNITY
Start: 2023-08-29 | End: 2023-11-07

## 2023-11-07 RX ORDER — DEXAMETHASONE 1 MG/1
1 TABLET ORAL
Qty: 1 | Refills: 0 | Status: COMPLETED | COMMUNITY
Start: 2023-07-31 | End: 2023-11-07

## 2023-11-07 RX ORDER — DEXAMETHASONE 1 MG/1
1 TABLET ORAL
Qty: 1 | Refills: 0 | Status: COMPLETED | COMMUNITY
Start: 2023-06-28 | End: 2023-11-07

## 2023-11-08 PROBLEM — F41.9 ANXIETY: Status: ACTIVE | Noted: 2023-11-07

## 2023-11-21 ENCOUNTER — APPOINTMENT (OUTPATIENT)
Dept: ENDOCRINOLOGY | Facility: CLINIC | Age: 68
End: 2023-11-21

## 2023-12-01 LAB
ALBUMIN SERPL ELPH-MCNC: 4.5 G/DL
ANION GAP SERPL CALC-SCNC: 13 MMOL/L
BUN SERPL-MCNC: 21 MG/DL
CALCIUM SERPL-MCNC: 10.1 MG/DL
CHLORIDE SERPL-SCNC: 108 MMOL/L
CO2 SERPL-SCNC: 22 MMOL/L
CREAT SERPL-MCNC: 0.5 MG/DL
EGFR: 102 ML/MIN/1.73M2
GLUCOSE SERPL-MCNC: 102 MG/DL
PHOSPHATE SERPL-MCNC: 3.7 MG/DL
POTASSIUM SERPL-SCNC: 4.2 MMOL/L
SODIUM SERPL-SCNC: 143 MMOL/L

## 2023-12-07 ENCOUNTER — APPOINTMENT (OUTPATIENT)
Dept: INTERVENTIONAL RADIOLOGY/VASCULAR | Facility: CLINIC | Age: 68
End: 2023-12-07
Payer: MEDICARE

## 2023-12-07 VITALS
DIASTOLIC BLOOD PRESSURE: 83 MMHG | WEIGHT: 185 LBS | HEIGHT: 65 IN | HEART RATE: 74 BPM | BODY MASS INDEX: 30.82 KG/M2 | RESPIRATION RATE: 17 BRPM | SYSTOLIC BLOOD PRESSURE: 130 MMHG | OXYGEN SATURATION: 96 %

## 2023-12-07 PROCEDURE — 99204 OFFICE O/P NEW MOD 45 MIN: CPT

## 2023-12-07 RX ORDER — ALPRAZOLAM 0.5 MG/1
0.5 TABLET ORAL
Qty: 2 | Refills: 0 | Status: COMPLETED | COMMUNITY
Start: 2023-11-08 | End: 2023-12-07

## 2024-01-30 ENCOUNTER — OUTPATIENT (OUTPATIENT)
Dept: OUTPATIENT SERVICES | Facility: HOSPITAL | Age: 69
LOS: 1 days | End: 2024-01-30

## 2024-01-30 VITALS
DIASTOLIC BLOOD PRESSURE: 80 MMHG | HEART RATE: 75 BPM | HEIGHT: 63 IN | SYSTOLIC BLOOD PRESSURE: 145 MMHG | WEIGHT: 197.98 LBS | RESPIRATION RATE: 16 BRPM | TEMPERATURE: 98 F | OXYGEN SATURATION: 97 %

## 2024-01-30 DIAGNOSIS — D35.00 BENIGN NEOPLASM OF UNSPECIFIED ADRENAL GLAND: ICD-10-CM

## 2024-01-30 DIAGNOSIS — B35.2 TINEA MANUUM: ICD-10-CM

## 2024-01-30 LAB
ALBUMIN SERPL ELPH-MCNC: 4.2 G/DL — SIGNIFICANT CHANGE UP (ref 3.3–5)
ALP SERPL-CCNC: 68 U/L — SIGNIFICANT CHANGE UP (ref 40–120)
ALT FLD-CCNC: 12 U/L — SIGNIFICANT CHANGE UP (ref 4–33)
ANION GAP SERPL CALC-SCNC: 13 MMOL/L — SIGNIFICANT CHANGE UP (ref 7–14)
AST SERPL-CCNC: 13 U/L — SIGNIFICANT CHANGE UP (ref 4–32)
BILIRUB SERPL-MCNC: 0.7 MG/DL — SIGNIFICANT CHANGE UP (ref 0.2–1.2)
BUN SERPL-MCNC: 17 MG/DL — SIGNIFICANT CHANGE UP (ref 7–23)
CALCIUM SERPL-MCNC: 8.9 MG/DL — SIGNIFICANT CHANGE UP (ref 8.4–10.5)
CHLORIDE SERPL-SCNC: 106 MMOL/L — SIGNIFICANT CHANGE UP (ref 98–107)
CO2 SERPL-SCNC: 23 MMOL/L — SIGNIFICANT CHANGE UP (ref 22–31)
CREAT SERPL-MCNC: 0.53 MG/DL — SIGNIFICANT CHANGE UP (ref 0.5–1.3)
EGFR: 100 ML/MIN/1.73M2 — SIGNIFICANT CHANGE UP
GLUCOSE SERPL-MCNC: 107 MG/DL — HIGH (ref 70–99)
HCT VFR BLD CALC: 45.2 % — HIGH (ref 34.5–45)
HGB BLD-MCNC: 14.4 G/DL — SIGNIFICANT CHANGE UP (ref 11.5–15.5)
MCHC RBC-ENTMCNC: 28.2 PG — SIGNIFICANT CHANGE UP (ref 27–34)
MCHC RBC-ENTMCNC: 31.9 GM/DL — LOW (ref 32–36)
MCV RBC AUTO: 88.5 FL — SIGNIFICANT CHANGE UP (ref 80–100)
NRBC # BLD: 0 /100 WBCS — SIGNIFICANT CHANGE UP (ref 0–0)
NRBC # FLD: 0 K/UL — SIGNIFICANT CHANGE UP (ref 0–0)
PLATELET # BLD AUTO: 243 K/UL — SIGNIFICANT CHANGE UP (ref 150–400)
POTASSIUM SERPL-MCNC: 3.4 MMOL/L — LOW (ref 3.5–5.3)
POTASSIUM SERPL-SCNC: 3.4 MMOL/L — LOW (ref 3.5–5.3)
PROT SERPL-MCNC: 7.1 G/DL — SIGNIFICANT CHANGE UP (ref 6–8.3)
RBC # BLD: 5.11 M/UL — SIGNIFICANT CHANGE UP (ref 3.8–5.2)
RBC # FLD: 14.5 % — SIGNIFICANT CHANGE UP (ref 10.3–14.5)
SODIUM SERPL-SCNC: 142 MMOL/L — SIGNIFICANT CHANGE UP (ref 135–145)
WBC # BLD: 4.99 K/UL — SIGNIFICANT CHANGE UP (ref 3.8–10.5)
WBC # FLD AUTO: 4.99 K/UL — SIGNIFICANT CHANGE UP (ref 3.8–10.5)

## 2024-01-30 NOTE — H&P PST ADULT - PROBLEM SELECTOR PLAN 1
Scheduled for Adrenal Vein Sampling on 02/05/24.  Preop instructions provided and patient verbalizes understanding.  Labs done and results pending.  Famotidine provided with instructions. Hibiclens provided with instructions and was signed by patient. Teach-back method was utilized to assess patient's understanding. Patient verbalized understanding.

## 2024-01-30 NOTE — H&P PST ADULT - NSICDXPASTMEDICALHX_GEN_ALL_CORE_FT
PAST MEDICAL HISTORY:  Adenoma of left adrenal gland     Elevated cortisol level     Kidney stones     Pancreatic neoplasm

## 2024-01-30 NOTE — H&P PST ADULT - HISTORY OF PRESENT ILLNESS
69 yr old female with no significant medical hx presents for preop evaluation with dx of Adrenal Adenoma noted on ct scan.  Patient c/o left flank pain and was sent for ct scans at which time it was noted that patient had pancreatic neoplasm, kidney stones which was treated medically and adrenal adenoma.  Patient is now scheduled for Adrenal Vein Sampling tentatively on 02/05/24.

## 2024-01-30 NOTE — H&P PST ADULT - NSICDXFAMILYHX_GEN_ALL_CORE_FT
FAMILY HISTORY:  Father  Still living? No  FHx: pancreatic cancer, Age at diagnosis: Age Unknown    Mother  Still living? Yes, Estimated age:   FHx: diabetes mellitus, Age at diagnosis: Age Unknown    Sibling  Still living? No  Family history of breast cancer, Age at diagnosis: Age Unknown  Family history of colon cancer, Age at diagnosis: Age Unknown

## 2024-01-31 PROBLEM — D35.02 BENIGN NEOPLASM OF LEFT ADRENAL GLAND: Chronic | Status: ACTIVE | Noted: 2024-01-30

## 2024-01-31 PROBLEM — N20.0 CALCULUS OF KIDNEY: Chronic | Status: ACTIVE | Noted: 2024-01-30

## 2024-01-31 PROBLEM — R79.89 OTHER SPECIFIED ABNORMAL FINDINGS OF BLOOD CHEMISTRY: Chronic | Status: ACTIVE | Noted: 2024-01-30

## 2024-01-31 PROBLEM — D49.0 NEOPLASM OF UNSPECIFIED BEHAVIOR OF DIGESTIVE SYSTEM: Chronic | Status: ACTIVE | Noted: 2024-01-30

## 2024-02-05 ENCOUNTER — RESULT REVIEW (OUTPATIENT)
Age: 69
End: 2024-02-05

## 2024-02-05 ENCOUNTER — OUTPATIENT (OUTPATIENT)
Dept: OUTPATIENT SERVICES | Facility: HOSPITAL | Age: 69
LOS: 1 days | End: 2024-02-05
Payer: MEDICARE

## 2024-02-05 DIAGNOSIS — D35.02 BENIGN NEOPLASM OF LEFT ADRENAL GLAND: ICD-10-CM

## 2024-02-05 PROCEDURE — 36500 INSERTION OF CATHETER VEIN: CPT | Mod: 26,LT,GC

## 2024-02-05 PROCEDURE — 75893 VENOUS SAMPLING BY CATHETER: CPT | Mod: 26,59

## 2024-02-05 NOTE — PRE PROCEDURE NOTE - PRE PROCEDURE EVALUATION
Interventional Radiology    HPI: 69y Female with a 2.8cm left adrenal adenoma, elevated cortisol and high aldosterone to renin ration. IR to perform adrenal vein sampling.    Allergies: No Known Allergies    Medications (Abx/Cardiac/Anticoagulation/Blood Products)      Data:    T(C): --  HR: --  BP: --  RR: --  SpO2: --    Exam  General: No acute distress  Chest: Non labored breathing  Abdomen: Non-distended  Extremities: No swelling, warm    Plan:   69y Female with a 2.8cm left adrenal adenoma, elevated cortisol and high aldosterone to renin ration. IR to perform adrenal vein sampling.  -- Relevant imaging and labs were reviewed.   -- Risks, benefits, and alternatives were explained to the patient and informed consent was obtained.

## 2024-02-09 DIAGNOSIS — E27.9 DISORDER OF ADRENAL GLAND, UNSPECIFIED: ICD-10-CM

## 2024-02-14 ENCOUNTER — APPOINTMENT (OUTPATIENT)
Dept: SURGERY | Facility: CLINIC | Age: 69
End: 2024-02-14
Payer: MEDICARE

## 2024-02-14 DIAGNOSIS — E26.09 OTHER PRIMARY HYPERALDOSTERONISM: ICD-10-CM

## 2024-02-14 DIAGNOSIS — E26.9 HYPERALDOSTERONISM, UNSPECIFIED: ICD-10-CM

## 2024-02-14 DIAGNOSIS — E24.9 CUSHING'S SYNDROME, UNSPECIFIED: ICD-10-CM

## 2024-02-14 PROCEDURE — 99214 OFFICE O/P EST MOD 30 MIN: CPT

## 2024-02-14 NOTE — ASSESSMENT
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  Plan: - The recent AVS results were reviewed with Ms. Meehan and her daughter.  I first explained that the aldosterone/cortisol ratio from the right adrenal to the adrenal aldosterone/cortisol ratio from the left adrenal was equal to approximately 2.4:1 which suggests a bilateral, however slightly higher right-sided hyper-secretion of aldosterone.  As such, I explained that a unilateral adrenalectomy would not be expected to cure her hyperaldosteronism and have therefore recommended continued observation and medical management in the future if indicated. - I then explained that an adrenal vein to peripheral vein gradient greater than 6.5 has been suggested to be consistent with a cortisol-secreting adenoma and that a cortisol lateralization ratio of 2.3 or greater has been reported to be consistent with autonomous cortisol secretion from predominantly one adrenal gland.  Based upon these values, I explained that she appears to also have bilateral, however slightly higher left-sided hyper-secretion of cortisol.  As such, I explained that a unilateral adrenalectomy would not be expected to cure her hypercortisolism and could potentially exacerbate her right-sided hyperaldosteronism.  I have therefore also recommended continued observation and possible medical management in the future if indicated. - The patient and her daughter expressed understanding of the above and agree with this plan.  She will continue to follow-up with Dr. Lowe and can follow-up with me PRN.

## 2024-02-14 NOTE — PHYSICAL EXAM
[Respiratory Effort] : normal respiratory effort [Normal Heart Sounds] : normal heart sounds [No Rash or Lesion] : No rash or lesion [Alert] : alert [Oriented to Person] : oriented to person [Oriented to Place] : oriented to place [Oriented to Time] : oriented to time [Calm] : calm [de-identified] : NAD [de-identified] : EOM intact [de-identified] : The neck appears flat. [de-identified] : The abdomen is soft, non-tender, and non-distended.  There is no CVA tenderness. [de-identified] : ALVAREZ x 4

## 2024-02-14 NOTE — HISTORY OF PRESENT ILLNESS
[de-identified] : Ms. Meehan presents for follow-up.  AVS, performed 02/05/2024 ([facility]), revealed a serum aldosterone in the IVC below the adrenals = 9.1 with serum cortisol = 5.3 and serum epinephrine = <15.  Serum aldosterone from the right adrenal vein was = 31.4 with serum cortisol = 8.1 (ACR = 3.877) and serum epinephrine = 310.  Serum aldosterone from the left adrenal vein was = 27.8 with serum cortisol = 16.9 (ACR = 1.645) and serum epinephrine = 746.  ACR R:L = 2.4:1.  LAV cortisol:IVC cortisol = 3.2.  LAV cortisol:JING cortisol = 2.1.

## 2024-03-01 ENCOUNTER — APPOINTMENT (OUTPATIENT)
Dept: ENDOCRINOLOGY | Facility: CLINIC | Age: 69
End: 2024-03-01
Payer: MEDICARE

## 2024-03-01 VITALS
HEIGHT: 65 IN | DIASTOLIC BLOOD PRESSURE: 82 MMHG | TEMPERATURE: 98.4 F | OXYGEN SATURATION: 95 % | SYSTOLIC BLOOD PRESSURE: 130 MMHG | HEART RATE: 68 BPM | BODY MASS INDEX: 32.49 KG/M2 | WEIGHT: 195 LBS

## 2024-03-01 DIAGNOSIS — D35.02 BENIGN NEOPLASM OF LEFT ADRENAL GLAND: ICD-10-CM

## 2024-03-01 PROCEDURE — 99214 OFFICE O/P EST MOD 30 MIN: CPT

## 2024-03-01 NOTE — HISTORY OF PRESENT ILLNESS
[FreeTextEntry1] : 69 year F here for f/up  of adrenal adenoma:    Summary:   Noted as incidental finding on CT urogram done for evaluation for nephrolithiasis   Date of Imagin2023 Modality: CT A/P left 2.8cm adrenal nodule c/w adenoma  Patient with past medical hx as below, remarkable for:  renal stones   Lab work up:  urine cortisol elevated. DST failed x2. dexamethasone level 235. Concern for possible glucocorticoid autonomy. ? subclinical  MRI 10/2023: Adrenal glands: 2.7cm x 2.8cm left adrenal adenoma and 11mm left adrenal adenoma. Normal right adrenal gland  Was referred to endocrine surgery and had AVS.   AVS did not strongly localize to lesion of adenoma, as such planned for medical management and monitoring

## 2024-03-18 ENCOUNTER — LABORATORY RESULT (OUTPATIENT)
Age: 69
End: 2024-03-18

## 2024-04-02 ENCOUNTER — APPOINTMENT (OUTPATIENT)
Dept: SURGICAL ONCOLOGY | Facility: CLINIC | Age: 69
End: 2024-04-02
Payer: MEDICARE

## 2024-04-02 VITALS
HEIGHT: 65 IN | SYSTOLIC BLOOD PRESSURE: 132 MMHG | HEART RATE: 69 BPM | WEIGHT: 200 LBS | OXYGEN SATURATION: 97 % | BODY MASS INDEX: 33.32 KG/M2 | DIASTOLIC BLOOD PRESSURE: 82 MMHG

## 2024-04-02 DIAGNOSIS — D49.0 NEOPLASM OF UNSPECIFIED BEHAVIOR OF DIGESTIVE SYSTEM: ICD-10-CM

## 2024-04-02 PROCEDURE — 99214 OFFICE O/P EST MOD 30 MIN: CPT

## 2024-04-12 NOTE — ASSESSMENT
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 MRCP from 05/23--> 10/23 stable. No high risk features and pt is asymptomatic  Tumor Markers, 10/16/23, normal.  Imaging Hx:  CT scan No contrast, of abdomen, performed on 04/07/2023 ([facility]) to workup a kidney stone, incidentally reported a 2.8 cm left adrenal adenoma as well as a possible 3.0 cm pancreatic tail lesion.  MRCP, performed 05/09/2023 ([facility]), showed a 2.9 cm left adrenal adenoma, a 1.0 cm left adrenal adenoma inferiorly, and a 1.2 cm pancreatic IPMN.  MRCP, 10/02/23--- stable pancreatic cystic lesions consistent with IPMN.   PLAN:  - recommended 6 month follow up surveillance w serial MR/MRCP w contrast , ordered for 04/2024; completed but no report available at this time. Based on images, pancreatic cystic lesion are still stable. Will call patient once reports is verified.  - will surveil yearly  - F/U with Telehealth 1 year -- April 2025  I have discussed the diagnosis, therapeutic plan and options with the patient at length. Patient expressed verbal understanding to proceed with proposed plan. All questions answered.

## 2024-04-12 NOTE — CONSULT LETTER
[Dear  ___] : Dear  [unfilled], [Consult Letter:] : I had the pleasure of evaluating your patient, [unfilled]. [Please see my note below.] : Please see my note below. [Consult Closing:] : Thank you very much for allowing me to participate in the care of this patient.  If you have any questions, please do not hesitate to contact me. [Sincerely,] : Sincerely, [FreeTextEntry3] : Farrukh Bejarano MD, FICS, FACS Director of Surgical Oncology- [facility] , Department of Surgery [employer] [facility] [employer] at [address] [address]  [address] [place] [address]  (mob) [phone] (o) [phone] (f) [phone]  [FreeTextEntry2] : Dr. Esquivel

## 2024-04-12 NOTE — PHYSICAL EXAM
[Normal] : supple, no neck mass and thyroid not enlarged [Normal Neck Lymph Nodes] : normal neck lymph nodes  [Normal Supraclavicular Lymph Nodes] : normal supraclavicular lymph nodes [Normal Groin Lymph Nodes] : normal groin lymph nodes [Normal Axillary Lymph Nodes] : normal axillary lymph nodes [Normal] : oriented to person, place and time, with appropriate affect [FreeTextEntry1] :  COVID -19 precautions as per [facility] policy was universally followed. [de-identified] :  Abdomen soft, non-tender, non-distended, and no palpable masses.

## 2024-04-12 NOTE — HISTORY OF PRESENT ILLNESS
[de-identified] : Ms. MARRERO is a 68 year y/o F referred by Dr. Mark Esquivel, here for consultation for pancreatic IPMN.  Patient w PMH Nephrolithiasis, and found to have adrenal adenoma as incidental finding after workup for kidney stones.   Imaging: (R)  CT scan No contrast, of abdomen, performed on 2023 (R) to workup a kidney stone, incidentally reported a 2.8 cm left adrenal adenoma as well as a possible 3.0 cm pancreatic tail lesion.  MRCP, performed 2023 (R), showed a 2.9 cm left adrenal adenoma, a 1.0 cm left adrenal adenoma inferiorly, and a 1.2 cm pancreatic IPMN.  MRCP, 10/02/23--- stable pancreatic cystic lesions consistent with IPMN.   Labs:  10/16/23-- CEA, wnl 1.5 10/16/23-- CA 19-9, wnl, 11  Fam Hx: Father,  at age 87, Pancreatic CA; Brother (colon CA, Age 50s), Sister (Breast CA) PSH: Denies   24: Patient is asymptomatic. She is overall feeling well. No abdominal pain. She is tolerating a regular diet with normal appetite. BM's are mostly normal. Denies fever, chills, N/V/D, unintentional weight loss. Patient completed MRCP. Report not ready.

## 2024-08-12 ENCOUNTER — LABORATORY RESULT (OUTPATIENT)
Age: 69
End: 2024-08-12

## 2024-08-14 ENCOUNTER — LABORATORY RESULT (OUTPATIENT)
Age: 69
End: 2024-08-14

## 2024-08-22 LAB — ALDOSTERONE SERUM: 33.6 NG/DL

## 2024-11-08 ENCOUNTER — APPOINTMENT (OUTPATIENT)
Dept: ENDOCRINOLOGY | Facility: CLINIC | Age: 69
End: 2024-11-08
Payer: MEDICARE

## 2024-11-08 VITALS
BODY MASS INDEX: 33.15 KG/M2 | WEIGHT: 199 LBS | DIASTOLIC BLOOD PRESSURE: 71 MMHG | RESPIRATION RATE: 18 BRPM | TEMPERATURE: 98 F | HEART RATE: 74 BPM | OXYGEN SATURATION: 97 % | HEIGHT: 65 IN | SYSTOLIC BLOOD PRESSURE: 149 MMHG

## 2024-11-08 DIAGNOSIS — D35.02 BENIGN NEOPLASM OF LEFT ADRENAL GLAND: ICD-10-CM

## 2024-11-08 PROCEDURE — 99214 OFFICE O/P EST MOD 30 MIN: CPT

## 2024-12-16 NOTE — ED ADULT TRIAGE NOTE - NS ED TRIAGE AVPU SCALE
Medical compliance with plan discussed and risks of non-compliance reviewed.    Patient education completed on disease process, etiology & prognosis.    Patient expresses understanding of the plan.    Refer to orders.    Return to clinic as clinically indicated as discussed with patient who verbalized understanding of & agreement with the plan.        Alert-The patient is alert, awake and responds to voice. The patient is oriented to time, place, and person. The triage nurse is able to obtain subjective information.

## 2025-02-14 RX ORDER — ALPRAZOLAM 0.5 MG/1
0.5 TABLET ORAL
Qty: 1 | Refills: 0 | Status: ACTIVE | COMMUNITY
Start: 2025-02-14 | End: 1900-01-01

## 2025-04-09 ENCOUNTER — APPOINTMENT (OUTPATIENT)
Dept: SURGICAL ONCOLOGY | Facility: CLINIC | Age: 70
End: 2025-04-09

## 2025-04-22 ENCOUNTER — APPOINTMENT (OUTPATIENT)
Dept: SURGICAL ONCOLOGY | Facility: CLINIC | Age: 70
End: 2025-04-22

## 2025-04-22 DIAGNOSIS — D49.0 NEOPLASM OF UNSPECIFIED BEHAVIOR OF DIGESTIVE SYSTEM: ICD-10-CM

## 2025-04-22 PROCEDURE — 99214 OFFICE O/P EST MOD 30 MIN: CPT

## 2025-04-29 ENCOUNTER — APPOINTMENT (OUTPATIENT)
Dept: SURGICAL ONCOLOGY | Facility: CLINIC | Age: 70
End: 2025-04-29

## 2025-05-22 ENCOUNTER — LABORATORY RESULT (OUTPATIENT)
Age: 70
End: 2025-05-22

## 2025-06-11 ENCOUNTER — APPOINTMENT (OUTPATIENT)
Dept: ENDOCRINOLOGY | Facility: CLINIC | Age: 70
End: 2025-06-11
Payer: MEDICARE

## 2025-06-11 VITALS
OXYGEN SATURATION: 96 % | WEIGHT: 200 LBS | HEIGHT: 65 IN | HEART RATE: 87 BPM | DIASTOLIC BLOOD PRESSURE: 87 MMHG | TEMPERATURE: 97 F | SYSTOLIC BLOOD PRESSURE: 137 MMHG | RESPIRATION RATE: 18 BRPM | BODY MASS INDEX: 33.32 KG/M2

## 2025-06-11 PROCEDURE — 99214 OFFICE O/P EST MOD 30 MIN: CPT

## 2025-09-15 ENCOUNTER — APPOINTMENT (OUTPATIENT)
Dept: SURGICAL ONCOLOGY | Facility: CLINIC | Age: 70
End: 2025-09-15
Payer: MEDICARE

## 2025-09-15 VITALS
SYSTOLIC BLOOD PRESSURE: 120 MMHG | DIASTOLIC BLOOD PRESSURE: 77 MMHG | HEART RATE: 71 BPM | HEIGHT: 65 IN | WEIGHT: 190 LBS | OXYGEN SATURATION: 96 % | BODY MASS INDEX: 31.65 KG/M2

## 2025-09-15 DIAGNOSIS — D49.0 NEOPLASM OF UNSPECIFIED BEHAVIOR OF DIGESTIVE SYSTEM: ICD-10-CM

## 2025-09-15 PROCEDURE — 99213 OFFICE O/P EST LOW 20 MIN: CPT

## 2025-09-18 RX ORDER — LOSARTAN POTASSIUM 50 MG/1
50 TABLET, FILM COATED ORAL
Qty: 90 | Refills: 0 | Status: ACTIVE | COMMUNITY
Start: 2024-09-17

## 2025-09-18 RX ORDER — LIDOCAINE 5% 700 MG/1
5 PATCH TOPICAL
Qty: 30 | Refills: 0 | Status: ACTIVE | COMMUNITY
Start: 2024-12-20

## 2025-09-18 RX ORDER — CLOTRIMAZOLE AND BETAMETHASONE DIPROPIONATE 10; .5 MG/G; MG/G
1-0.05 CREAM TOPICAL
Qty: 15 | Refills: 0 | Status: ACTIVE | COMMUNITY
Start: 2025-07-30

## 2025-09-24 PROBLEM — M06.9 RHEUMATOID ARTHRITIS: Status: RESOLVED | Noted: 2025-09-24 | Resolved: 2025-09-24

## 2025-09-24 PROBLEM — M17.12 PRIMARY OSTEOARTHRITIS OF LEFT KNEE: Status: ACTIVE | Noted: 2025-09-24

## 2025-09-24 PROBLEM — I10 HYPERTENSION: Status: RESOLVED | Noted: 2025-09-24 | Resolved: 2025-09-24

## 2025-09-24 PROBLEM — M17.11 PRIMARY OSTEOARTHRITIS OF RIGHT KNEE: Status: ACTIVE | Noted: 2025-09-24

## 2025-09-24 PROBLEM — M25.462 EFFUSION OF LEFT KNEE: Status: ACTIVE | Noted: 2025-09-24
